# Patient Record
Sex: MALE | URBAN - METROPOLITAN AREA
[De-identification: names, ages, dates, MRNs, and addresses within clinical notes are randomized per-mention and may not be internally consistent; named-entity substitution may affect disease eponyms.]

---

## 2022-11-17 ENCOUNTER — APPOINTMENT (RX ONLY)
Dept: URBAN - METROPOLITAN AREA CLINIC 41 | Facility: CLINIC | Age: 37
Setting detail: DERMATOLOGY
End: 2022-11-17

## 2022-11-17 DIAGNOSIS — L72.11 PILAR CYST: ICD-10-CM

## 2022-11-17 PROCEDURE — 99202 OFFICE O/P NEW SF 15 MIN: CPT

## 2022-11-17 PROCEDURE — ? COUNSELING

## 2022-11-17 PROCEDURE — ? DEFER

## 2022-11-17 PROCEDURE — ? OTHER

## 2022-11-17 ASSESSMENT — LOCATION DETAILED DESCRIPTION DERM: LOCATION DETAILED: LEFT MEDIAL FRONTAL SCALP

## 2022-11-17 ASSESSMENT — LOCATION ZONE DERM: LOCATION ZONE: SCALP

## 2022-11-17 ASSESSMENT — LOCATION SIMPLE DESCRIPTION DERM: LOCATION SIMPLE: LEFT SCALP

## 2022-11-17 NOTE — PROCEDURE: OTHER
Render Risk Assessment In Note?: no
Note Text (......Xxx Chief Complaint.): This diagnosis correlates with the
Detail Level: Detailed
Other (Free Text): Lesion measures 1.8 cm

## 2022-11-17 NOTE — PROCEDURE: DEFER
Detail Level: Zone
Instructions (Optional): Procedure would need to be scheduled for 5 units
Introduction Text (Please End With A Colon): The following was deferred for the following:
X Size Of Lesion In Cm (Optional): 0
Size Of Lesion In Cm (Optional): 1.8
Procedure To Be Performed At Next Visit: Excision